# Patient Record
Sex: FEMALE | Race: WHITE | NOT HISPANIC OR LATINO | URBAN - NONMETROPOLITAN AREA
[De-identification: names, ages, dates, MRNs, and addresses within clinical notes are randomized per-mention and may not be internally consistent; named-entity substitution may affect disease eponyms.]

---

## 2023-01-05 ENCOUNTER — HOSPITAL ENCOUNTER (EMERGENCY)
Facility: HOSPITAL | Age: 78
Discharge: HOME | End: 2023-01-05
Attending: STUDENT IN AN ORGANIZED HEALTH CARE EDUCATION/TRAINING PROGRAM
Payer: MEDICARE

## 2023-01-05 VITALS
RESPIRATION RATE: 16 BRPM | SYSTOLIC BLOOD PRESSURE: 141 MMHG | OXYGEN SATURATION: 96 % | HEART RATE: 71 BPM | DIASTOLIC BLOOD PRESSURE: 69 MMHG | TEMPERATURE: 98 F | WEIGHT: 152 LBS

## 2023-01-05 DIAGNOSIS — Z20.822 COVID-19 RULED OUT BY LABORATORY TESTING: Primary | ICD-10-CM

## 2023-01-05 LAB
CTP QC/QA: YES
SARS-COV-2 RDRP RESP QL NAA+PROBE: NEGATIVE

## 2023-01-05 PROCEDURE — 87635 SARS-COV-2 COVID-19 AMP PRB: CPT

## 2023-01-05 PROCEDURE — 99282 EMERGENCY DEPT VISIT SF MDM: CPT

## 2023-01-05 NOTE — ED PROVIDER NOTES
Encounter Date: 1/5/2023       History     Chief Complaint   Patient presents with    COVID-19 Concerns     Pt is asymptomatic but states her daughter is positive for COVID and would like to be tested for COVID.     77-year-old female to ED requesting COVID test.  She reports her daughter tested positive today COVID.  She last made contact with her yesterday.  She denies any chest pain, shortness a breath, fever, body aches.  She does report a intermittent nonproductive cough since December.  No medications taken at home.    The history is provided by the patient.   Review of patient's allergies indicates:  No Known Allergies  History reviewed. No pertinent past medical history.  History reviewed. No pertinent surgical history.  History reviewed. No pertinent family history.  Social History     Tobacco Use    Smoking status: Never    Smokeless tobacco: Never     Review of Systems   Constitutional:  Negative for fever.   HENT:  Negative for congestion and sore throat.    Eyes: Negative.    Respiratory:  Negative for shortness of breath.    Cardiovascular:  Negative for chest pain.   Gastrointestinal:  Negative for nausea.   Endocrine: Negative.    Genitourinary:  Negative for dysuria.   Musculoskeletal:  Negative for back pain.   Skin:  Negative for rash.   Allergic/Immunologic: Negative.    Neurological:  Negative for weakness.   Hematological:  Does not bruise/bleed easily.   Psychiatric/Behavioral: Negative.       Physical Exam     Initial Vitals [01/05/23 1055]   BP Pulse Resp Temp SpO2   (!) 141/69 71 16 98.2 °F (36.8 °C) 96 %      MAP       --         Physical Exam    Nursing note and vitals reviewed.  Constitutional: She appears well-developed and well-nourished.   HENT:   Head: Normocephalic and atraumatic.   Eyes: EOM are normal.   Neck: Neck supple.   Normal range of motion.  Cardiovascular:  Normal rate and regular rhythm.           Pulmonary/Chest: Breath sounds normal. No respiratory distress. She has no  wheezes.   Abdominal: She exhibits no distension.   Musculoskeletal:         General: Normal range of motion.      Cervical back: Normal range of motion and neck supple.     Neurological: She is alert and oriented to person, place, and time.   Skin: Skin is warm and dry.   Psychiatric: She has a normal mood and affect. Thought content normal.       ED Course   Procedures  Labs Reviewed   SARS-COV-2 RDRP GENE    Narrative:     This test utilizes isothermal nucleic acid amplification technology to detect the SARS-CoV-2 RdRp nucleic acid segment. The analytical sensitivity (limit of detection) is 500 copies/swab.     A POSITIVE result is indicative of the presence of SARS-CoV-2 RNA; clinical correlation with patient history and other diagnostic information is necessary to determine patient infection status.    A NEGATIVE result means that SARS-CoV-2 nucleic acids are not present above the limit of detection. A NEGATIVE result should be treated as presumptive. It does not rule out the possibility of COVID-19 and should not be the sole basis for treatment decisions. If COVID-19 is strongly suspected based on clinical and exposure history, re-testing using an alternate molecular assay should be considered.     This test is only for use under the Food and Drug Administration s Emergency Use Authorization (EUA).     Commercial kits are provided by [employer]. Performance characteristics of the EUA have been independently verified by [facility] Department of Pathology and Laboratory Medicine.   _________________________________________________________________   The authorized Fact Sheet for Healthcare Providers and the authorized Fact Sheet for Patients of the ID NOW COVID-19 are available on the FDA website:    https://www.fda.gov/media/675587/download      https://www.fda.gov/media/039141/download              Imaging Results    None          Medications - No data to display  Medical Decision Making:    Clinical Tests:   Lab Tests: Ordered and Reviewed  ED Management:  77-year-old female to ED for COVID testing.  She denies any new symptoms.  She does report an intermittent cough that has been going on since December.  Her lungs are clear in all fields, no signs of respiratory distress or noted.  She tested negative for COVID.  She was instructed on isolation protocols if becoming symptomatic.  She was stable for discharge.                        Clinical Impression:   Final diagnoses:  [Z20.822] COVID-19 ruled out by laboratory testing (Primary)        ED Disposition Condition    Discharge Stable          ED Prescriptions    None       Follow-up Information       Follow up With Specialties Details Why Contact Info    [facility], Internal Medicine, Gynecology, Dental General Practice In 2 days  [address] [facility] [address]  [phone]               Meng Georges NP  01/05/23 1118       Meng Georges NP  01/30/23 1341

## 2023-01-05 NOTE — DISCHARGE INSTRUCTIONS
You tested negative for COVID today.  Please do not visit her  if you start to have signs of cough, congestion, diarrhea, nausea, fever.  Follow up with your primary care provider as needed.  Please do not go by her daughter either.

## 2023-08-21 ENCOUNTER — HOSPITAL ENCOUNTER (EMERGENCY)
Facility: HOSPITAL | Age: 78
Discharge: HOME | End: 2023-08-21
Attending: EMERGENCY MEDICINE
Payer: MEDICARE

## 2023-08-21 VITALS
OXYGEN SATURATION: 96 % | RESPIRATION RATE: 18 BRPM | SYSTOLIC BLOOD PRESSURE: 176 MMHG | WEIGHT: 154.81 LBS | DIASTOLIC BLOOD PRESSURE: 75 MMHG | BODY MASS INDEX: 28.49 KG/M2 | TEMPERATURE: 98 F | HEART RATE: 79 BPM | HEIGHT: 62 IN

## 2023-08-21 DIAGNOSIS — Z51.89 ENCOUNTER FOR WOUND RE-CHECK: Primary | ICD-10-CM

## 2023-08-21 PROCEDURE — 99282 EMERGENCY DEPT VISIT SF MDM: CPT

## 2023-08-21 NOTE — ED PROVIDER NOTES
"Encounter Date: 8/21/2023       History     Chief Complaint   Patient presents with    Wound Check     Pt states went to  in [place] yesterday and had a "hematoma packed".  Packing supposed to fall out tomorrow but fell out today and has been bleeding.  Was told if it starts to bleed to come to ER.     78-year-old female presents to the emergency room for wound check to her left labia which was I&D yesterday at by [facility].  Patient states she had packing placed yesterday in it fell out last night.  Patient is concerned due to bleeding.  Patient is currently on antibiotics and pain medications        Review of patient's allergies indicates:  No Known Allergies  No past medical history on file.  No past surgical history on file.  No family history on file.  Social History     Tobacco Use    Smoking status: Never    Smokeless tobacco: Never     Review of Systems   Constitutional:  Negative for fever.   HENT:  Negative for sore throat.    Respiratory:  Negative for shortness of breath.    Cardiovascular:  Negative for chest pain.   Gastrointestinal:  Negative for nausea.   Genitourinary:  Negative for dysuria.   Musculoskeletal:  Negative for back pain.   Skin:  Positive for wound. Negative for rash.   Neurological:  Negative for weakness.   Hematological:  Does not bruise/bleed easily.   All other systems reviewed and are negative.      Physical Exam     Initial Vitals [08/21/23 1531]   BP Pulse Resp Temp SpO2   (!) 176/75 79 18 97.9 °F (36.6 °C) 96 %      MAP       --         Physical Exam    Nursing note and vitals reviewed.  Constitutional: She appears well-developed and well-nourished.   HENT:   Head: Normocephalic and atraumatic.   Eyes: Pupils are equal, round, and reactive to light.   Neck:   Normal range of motion.  Musculoskeletal:         General: Normal range of motion.      Cervical back: Normal range of motion.     Neurological: She is alert and oriented to person, place, and time.   Skin: " Skin is warm and dry.   I&D left labia abscess noted. no active bleeding or draining noted.  No treatment is needed at this time.   Psychiatric: She has a normal mood and affect.         ED Course   Procedures  Labs Reviewed - No data to display       Imaging Results    None          Medications - No data to display  Medical Decision Making                             Clinical Impression:   Final diagnoses:  [Z51.89] Encounter for wound re-check (Primary)        ED Disposition Condition    Discharge Stable          ED Prescriptions    None       Follow-up Information    None          Tiki Mojica, NP  08/21/23 6499

## 2023-08-25 ENCOUNTER — HOSPITAL ENCOUNTER (EMERGENCY)
Facility: HOSPITAL | Age: 78
Discharge: HOME | End: 2023-08-25
Attending: EMERGENCY MEDICINE
Payer: MEDICARE

## 2023-08-25 VITALS
WEIGHT: 154 LBS | HEART RATE: 83 BPM | DIASTOLIC BLOOD PRESSURE: 65 MMHG | OXYGEN SATURATION: 98 % | SYSTOLIC BLOOD PRESSURE: 141 MMHG | RESPIRATION RATE: 18 BRPM | TEMPERATURE: 98 F | BODY MASS INDEX: 28.17 KG/M2

## 2023-08-25 DIAGNOSIS — N76.4 LEFT GENITAL LABIAL ABSCESS: Primary | ICD-10-CM

## 2023-08-25 DIAGNOSIS — Z09 ENCOUNTER FOR RECHECK OF ABSCESS FOLLOWING INCISION AND DRAINAGE: ICD-10-CM

## 2023-08-25 PROCEDURE — 25000003 PHARM REV CODE 250: Performed by: EMERGENCY MEDICINE

## 2023-08-25 PROCEDURE — 63600175 PHARM REV CODE 636 W HCPCS: Performed by: EMERGENCY MEDICINE

## 2023-08-25 PROCEDURE — 99284 EMERGENCY DEPT VISIT MOD MDM: CPT

## 2023-08-25 PROCEDURE — 96372 THER/PROPH/DIAG INJ SC/IM: CPT | Performed by: EMERGENCY MEDICINE

## 2023-08-25 RX ORDER — AMLODIPINE BESYLATE 10 MG/1
10 TABLET ORAL
COMMUNITY
Start: 2023-08-16

## 2023-08-25 RX ORDER — PAROXETINE HYDROCHLORIDE 20 MG/1
20 TABLET, FILM COATED ORAL EVERY MORNING
COMMUNITY
Start: 2023-06-29

## 2023-08-25 RX ORDER — LOSARTAN POTASSIUM 50 MG/1
50 TABLET ORAL
COMMUNITY
Start: 2023-07-21

## 2023-08-25 RX ORDER — CEFTRIAXONE 1 G/1
1 INJECTION, POWDER, FOR SOLUTION INTRAMUSCULAR; INTRAVENOUS
Status: COMPLETED | OUTPATIENT
Start: 2023-08-25 | End: 2023-08-25

## 2023-08-25 RX ORDER — MUPIROCIN 20 MG/G
1 OINTMENT TOPICAL
Status: COMPLETED | OUTPATIENT
Start: 2023-08-25 | End: 2023-08-25

## 2023-08-25 RX ORDER — LIDOCAINE HYDROCHLORIDE 10 MG/ML
2 INJECTION, SOLUTION EPIDURAL; INFILTRATION; INTRACAUDAL; PERINEURAL
Status: COMPLETED | OUTPATIENT
Start: 2023-08-25 | End: 2023-08-25

## 2023-08-25 RX ORDER — HYDROCHLOROTHIAZIDE 25 MG/1
25 TABLET ORAL
COMMUNITY
Start: 2023-06-08

## 2023-08-25 RX ORDER — CLINDAMYCIN HYDROCHLORIDE 150 MG/1
300 CAPSULE ORAL EVERY 8 HOURS
Qty: 42 CAPSULE | Refills: 0 | Status: SHIPPED | OUTPATIENT
Start: 2023-08-25 | End: 2023-09-01

## 2023-08-25 RX ORDER — SIMVASTATIN 20 MG/1
20 TABLET, FILM COATED ORAL
COMMUNITY
Start: 2023-07-30

## 2023-08-25 RX ADMIN — MUPIROCIN 22 G: 20 OINTMENT TOPICAL at 08:08

## 2023-08-25 RX ADMIN — CEFTRIAXONE SODIUM 1 G: 1 INJECTION, POWDER, FOR SOLUTION INTRAMUSCULAR; INTRAVENOUS at 08:08

## 2023-08-25 RX ADMIN — LIDOCAINE HYDROCHLORIDE 20 MG: 10 INJECTION, SOLUTION EPIDURAL; INFILTRATION; INTRACAUDAL; PERINEURAL at 08:08

## 2023-08-25 NOTE — ED PROVIDER NOTES
"Encounter Date: 8/25/2023       History     Chief Complaint   Patient presents with    Abscess     5 days ago abscess cut that is located "on the inside of my private" it just isnt healing and its painful      78-year-old female with recent left labial abscess I&D at an urgent care, was packed, but packing fell out accidentally seen here the day afterwards without any further treatment.  Patient is stating her left labia still hurting her and wants it rechecked.  No fever.  Not ill appearing, alert oriented x4, GCS is 15.  On Bactrim      Review of patient's allergies indicates:  No Known Allergies  History reviewed. No pertinent past medical history.  History reviewed. No pertinent surgical history.  History reviewed. No pertinent family history.  Social History     Tobacco Use    Smoking status: Never    Smokeless tobacco: Never     Review of Systems   Constitutional:  Negative for fever.   HENT:  Negative for sore throat.    Respiratory:  Negative for shortness of breath.    Cardiovascular:  Negative for chest pain.   Gastrointestinal:  Negative for nausea.   Genitourinary:  Positive for vaginal pain. Negative for dysuria.   Musculoskeletal:  Negative for back pain.   Skin:  Positive for wound. Negative for rash.   Neurological:  Negative for weakness.   Hematological:  Does not bruise/bleed easily.   All other systems reviewed and are negative.      Physical Exam     Initial Vitals [08/25/23 0807]   BP Pulse Resp Temp SpO2   (!) 141/65 83 18 98.3 °F (36.8 °C) 98 %      MAP       --         Physical Exam    Nursing note and vitals reviewed.  Constitutional: She appears well-developed and well-nourished. She is not diaphoretic. No distress.   HENT:   Head: Normocephalic and atraumatic.   Eyes: Conjunctivae and EOM are normal. Pupils are equal, round, and reactive to light.   Neck: Neck supple.   Normal range of motion.  Cardiovascular:  Normal rate, regular rhythm, normal heart sounds and intact distal pulses.     "       No murmur heard.  Pulmonary/Chest: Breath sounds normal. No respiratory distress. She has no wheezes. She has no rhonchi. She has no rales. She exhibits no tenderness.   Abdominal: Abdomen is soft. Bowel sounds are normal.   Genitourinary:    Genitourinary Comments: Examination performed with female RN in room.  Left labia with minimal swelling, the purulent I&D site was deroofed allowing for minimal drainage.  No fluctuance.  Minimal induration     Musculoskeletal:         General: No tenderness or edema. Normal range of motion.      Cervical back: Normal range of motion and neck supple.     Neurological: She is alert and oriented to person, place, and time. She has normal strength. No cranial nerve deficit. GCS score is 15. GCS eye subscore is 4. GCS verbal subscore is 5. GCS motor subscore is 6.   Skin: Skin is warm and dry. Capillary refill takes less than 2 seconds.         ED Course   Procedures  Labs Reviewed - No data to display       Imaging Results    None          Medications   mupirocin 2 % ointment 22 g (has no administration in time range)   cefTRIAXone injection 1 g (has no administration in time range)   LIDOcaine (PF) 10 mg/ml (1%) injection 20 mg (has no administration in time range)     Medical Decision Making  78-year-old female status post I&D of left labia 6 days ago.  No need for repeat I&D.  Will add clindamycin to her regimen    Risk  Prescription drug management.                               Clinical Impression:   Final diagnoses:  [N76.4] Left genital labial abscess (Primary)  [Z09] Encounter for recheck of abscess following incision and drainage        ED Disposition Condition    Discharge Stable          ED Prescriptions       Medication Sig Dispense Start Date End Date Auth. Provider    clindamycin (CLEOCIN) 150 MG capsule Take 2 capsules (300 mg total) by mouth every 8 (eight) hours. for 7 days 42 capsule 8/25/2023 9/1/2023 Adán Alvarado MD          Follow-up Information        Follow up With Specialties Details Why Contact Info Additional Information    Primary care physician  In 2 days       [facility] - Emergency Department Emergency Medicine  As needed, If symptoms worsen [address] [facility] [id number] [phone] Floor 1             Adán Alvarado MD  08/25/23 8182

## 2025-07-09 PROBLEM — I27.20 PULMONARY HYPERTENSION, UNSPECIFIED: Status: ACTIVE | Noted: 2025-07-09

## 2025-07-09 PROBLEM — E78.2 MIXED HYPERLIPIDEMIA: Status: ACTIVE | Noted: 2025-07-09

## 2025-07-09 PROBLEM — I10 PRIMARY HYPERTENSION: Status: ACTIVE | Noted: 2025-07-09

## 2025-07-09 PROBLEM — F32.1 CURRENT MODERATE EPISODE OF MAJOR DEPRESSIVE DISORDER: Status: ACTIVE | Noted: 2025-07-09

## 2025-07-09 PROBLEM — Z76.89 ENCOUNTER TO ESTABLISH CARE: Status: ACTIVE | Noted: 2025-07-09

## 2025-07-10 PROBLEM — M06.9 RHEUMATOID ARTHRITIS: Status: ACTIVE | Noted: 2025-07-10

## 2025-07-10 PROBLEM — K21.9 GASTROESOPHAGEAL REFLUX DISEASE WITHOUT ESOPHAGITIS: Status: ACTIVE | Noted: 2025-07-10

## 2025-07-14 ENCOUNTER — OFFICE VISIT (OUTPATIENT)
Dept: OBSTETRICS AND GYNECOLOGY | Facility: CLINIC | Age: 80
End: 2025-07-14
Payer: MEDICARE

## 2025-07-14 VITALS
HEIGHT: 62 IN | HEART RATE: 65 BPM | SYSTOLIC BLOOD PRESSURE: 139 MMHG | DIASTOLIC BLOOD PRESSURE: 65 MMHG | BODY MASS INDEX: 25.76 KG/M2 | WEIGHT: 140 LBS

## 2025-07-14 DIAGNOSIS — I10 PRIMARY HYPERTENSION: ICD-10-CM

## 2025-07-14 DIAGNOSIS — Z76.89 ENCOUNTER TO ESTABLISH CARE: Primary | ICD-10-CM

## 2025-07-14 DIAGNOSIS — N39.46 MIXED INCONTINENCE: ICD-10-CM

## 2025-07-14 DIAGNOSIS — Z90.710 HISTORY OF HYSTERECTOMY: ICD-10-CM

## 2025-07-14 PROCEDURE — 1126F AMNT PAIN NOTED NONE PRSNT: CPT | Mod: CPTII,S$GLB,, | Performed by: OBSTETRICS & GYNECOLOGY

## 2025-07-14 PROCEDURE — 3078F DIAST BP <80 MM HG: CPT | Mod: CPTII,S$GLB,, | Performed by: OBSTETRICS & GYNECOLOGY

## 2025-07-14 PROCEDURE — 99203 OFFICE O/P NEW LOW 30 MIN: CPT | Mod: S$GLB,,, | Performed by: OBSTETRICS & GYNECOLOGY

## 2025-07-14 PROCEDURE — 3075F SYST BP GE 130 - 139MM HG: CPT | Mod: CPTII,S$GLB,, | Performed by: OBSTETRICS & GYNECOLOGY

## 2025-07-14 PROCEDURE — 1159F MED LIST DOCD IN RCRD: CPT | Mod: CPTII,S$GLB,, | Performed by: OBSTETRICS & GYNECOLOGY

## 2025-07-14 PROCEDURE — 1160F RVW MEDS BY RX/DR IN RCRD: CPT | Mod: CPTII,S$GLB,, | Performed by: OBSTETRICS & GYNECOLOGY

## 2025-07-14 PROCEDURE — 99999 PR PBB SHADOW E&M-EST. PATIENT-LVL III: CPT | Mod: PBBFAC,,, | Performed by: OBSTETRICS & GYNECOLOGY

## 2025-07-14 NOTE — PROGRESS NOTES
Subjective:    Patient ID: Sarah Richey is a 79 y.o. female.     Chief Complaint: establish care    History of Present Illness:  Sarah presents today to establish care. .No LMP recorded. Patient has had a hysterectomy.. She is currently using no hormone therapy and she reports no problems with hot flashes, night sweats, irritability or vaginal dryness. She denies breast tenderness, denies masses, denies nipple discharge. She does report some incontinence but does not want any intervention--especially surgical--at this time. Bowel movements have not significantly changed.       Menstrual History:   No LMP recorded. Patient has had a hysterectomy..     OB History    No obstetric history on file.         Review of Systems   Constitutional:  Negative for chills, fatigue and fever.   Respiratory:  Negative for shortness of breath.    Cardiovascular:  Negative for chest pain.   Gastrointestinal:  Negative for abdominal pain, constipation, diarrhea and nausea.   Genitourinary:  Negative for bladder incontinence, dysuria, hot flashes, pelvic pain, vaginal bleeding, vaginal discharge and vaginal dryness.   Neurological:  Negative for headaches.   Psychiatric/Behavioral:  Negative for depression.          Objective:    Vital Signs:  Vitals:    07/14/25 1135   BP: 139/65   Pulse: 65       Physical Exam:  General:  alert, no distress   Skin:  Skin color, texture, turgor normal. No rashes or lesions   Abdomen:  Soft, nontender   Extremities: No cyanosis, clubbing, edema       Will obtain records from her previous gynecologist to determine when her bone density and mammogram are due.  We will then order accordingly.  All questions answered    I spent a total of 30 minutes on the day of the visit.  This includes face to face time and non-face to face time preparing to see the patient (eg, review of tests), obtaining and/or reviewing separately obtained history, documenting clinical information in the electronic or other health  record, independently interpreting results and communicating results to the patient/family/caregiver, or care coordinator.       Assessment:      1. Encounter to establish care    2. Primary hypertension    3. History of hysterectomy    4. Mixed incontinence          Plan:      Encounter to establish care    Primary hypertension    History of hysterectomy    Mixed incontinence        Health Maintenance and Screening:   Sarah was counseled on A.C.O.G. Pap guidelines and recommendations for yearly pelvic exams in addition to recommendations for yearly mammograms and monthly self breast exams, and adequate calcium and vitamin D in her diet.     A discussion of needed Health Maintenance Screening was done with Sarah. She was counseled that she is overdue for Mammogram and DEXA: She will schedule:  Once we obtain old records and determine when they are due.       Megan Hurley MD, FACOG   07/14/2025 11:49 AM

## 2025-08-13 DIAGNOSIS — Z12.31 SCREENING MAMMOGRAM FOR BREAST CANCER: Primary | ICD-10-CM

## 2025-08-13 PROBLEM — Z00.00 WELLNESS EXAMINATION: Status: ACTIVE | Noted: 2025-08-13

## 2025-08-13 PROBLEM — D35.1 PARATHYROID ADENOMA: Status: ACTIVE | Noted: 2025-08-13

## 2025-09-04 ENCOUNTER — HOSPITAL ENCOUNTER (OUTPATIENT)
Dept: RADIOLOGY | Facility: HOSPITAL | Age: 80
Discharge: HOME | End: 2025-09-04
Attending: INTERNAL MEDICINE
Payer: MEDICARE

## 2025-09-04 DIAGNOSIS — M81.0 AGE-RELATED OSTEOPOROSIS WITHOUT CURRENT PATHOLOGICAL FRACTURE: ICD-10-CM

## 2025-09-04 PROCEDURE — 77080 DXA BONE DENSITY AXIAL: CPT | Mod: TC
